# Patient Record
Sex: MALE | URBAN - METROPOLITAN AREA
[De-identification: names, ages, dates, MRNs, and addresses within clinical notes are randomized per-mention and may not be internally consistent; named-entity substitution may affect disease eponyms.]

---

## 2024-04-09 ENCOUNTER — APPOINTMENT (OUTPATIENT)
Dept: URBAN - METROPOLITAN AREA CLINIC 253 | Age: 24
Setting detail: DERMATOLOGY
End: 2024-04-10

## 2024-04-09 VITALS — WEIGHT: 155 LBS | HEIGHT: 67 IN | RESPIRATION RATE: 14 BRPM

## 2024-04-09 PROBLEM — D48.5 NEOPLASM OF UNCERTAIN BEHAVIOR OF SKIN: Status: ACTIVE | Noted: 2024-04-09

## 2024-04-09 PROCEDURE — OTHER MIPS QUALITY: OTHER

## 2024-04-09 PROCEDURE — OTHER COUNSELING: OTHER

## 2024-04-09 PROCEDURE — OTHER ADDITIONAL NOTES: OTHER

## 2024-04-09 PROCEDURE — 99202 OFFICE O/P NEW SF 15 MIN: CPT

## 2024-04-09 PROCEDURE — OTHER PRESCRIPTION: OTHER

## 2024-04-09 RX ORDER — DESONIDE 0.5 MG/G
CREAM TOPICAL
Qty: 15 | Refills: 1 | Status: ERX | COMMUNITY
Start: 2024-04-09

## 2024-04-09 NOTE — HPI: SKIN LESION
How Severe Is Your Skin Lesion?: moderate
Is This A New Presentation, Or A Follow-Up?: Skin Lesion
Additional History: It has been present for 5-6 years. He previously saw a dermatologist and they prescribed hydrocortisone. He also used an SPF sunscreen. He states it has not improved within the last 6 months. He states it is occasionally itchy. He describes it as patchy and rough. He states it is not painful. He reports that the sunshine or warm weather makes it worse.

## 2024-04-09 NOTE — PROCEDURE: ADDITIONAL NOTES
Render Risk Assessment In Note?: no
Detail Level: Simple
Additional Notes: Informed patient that the lesion is a benign mole pattern that can appear in adolescence or earlier. \\nAlso informed patient that they are completely benign and are not contagious. \\nDiscussed that these lesions can become irritated and inflamed.\\nDiscussed trying a strong ointment before taking a biopsy to confirm. We also discussed cautery as an option for treating the lesion.\\nPatient elects to try the ointment before doing the biopsy or cautery.\\nAdvised patient to be consistent with applying sunblock.